# Patient Record
Sex: FEMALE | Race: WHITE | NOT HISPANIC OR LATINO | Employment: UNEMPLOYED | URBAN - METROPOLITAN AREA
[De-identification: names, ages, dates, MRNs, and addresses within clinical notes are randomized per-mention and may not be internally consistent; named-entity substitution may affect disease eponyms.]

---

## 2022-04-07 ENCOUNTER — TELEPHONE (OUTPATIENT)
Dept: PHYSICAL MEDICINE AND REHAB | Facility: CLINIC | Age: 42
End: 2022-04-07

## 2022-04-07 NOTE — TELEPHONE ENCOUNTER
Pt advised that this department does not take external medicaid referrals at this time. Pt also advised that Dr. Celaya does not treat back/neck pain. Tried to advise of the different departments at [facility] that treat back/neck pain, but pt hung up prior to this nurse being able to give the information.

## 2022-04-07 NOTE — TELEPHONE ENCOUNTER
----- Message from Amarilis Holden sent at 4/7/2022 10:25 AM CDT -----  Type:  Patient Call Back    Who Called: Malissa     What is the reqeust in detail: Pt is requesting a call back regarding a referral from  [facility] and patient would like to schedule an appt to be seen. Please Advise     Can the clinic reply by [facility]?    Best Call Back Number: Click to [phone]